# Patient Record
Sex: FEMALE | Race: WHITE | NOT HISPANIC OR LATINO | Employment: UNEMPLOYED | URBAN - METROPOLITAN AREA
[De-identification: names, ages, dates, MRNs, and addresses within clinical notes are randomized per-mention and may not be internally consistent; named-entity substitution may affect disease eponyms.]

---
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
## 2025-01-06 ENCOUNTER — TELEMEDICINE (OUTPATIENT)
Dept: PSYCHIATRY | Facility: CLINIC | Age: 20
End: 2025-01-06
Payer: COMMERCIAL

## 2025-01-06 ENCOUNTER — PATIENT MESSAGE (OUTPATIENT)
Dept: FAMILY MEDICINE CLINIC | Facility: CLINIC | Age: 20
End: 2025-01-06
Payer: COMMERCIAL

## 2025-01-06 DIAGNOSIS — F90.2 ATTENTION DEFICIT HYPERACTIVITY DISORDER, COMBINED TYPE: Primary | ICD-10-CM

## 2025-01-06 DIAGNOSIS — F43.10 POSTTRAUMATIC STRESS DISORDER: ICD-10-CM

## 2025-01-06 DIAGNOSIS — F41.9 ANXIETY: ICD-10-CM

## 2025-01-06 RX ORDER — DEXTROAMPHETAMINE SACCHARATE, AMPHETAMINE ASPARTATE MONOHYDRATE, DEXTROAMPHETAMINE SULFATE AND AMPHETAMINE SULFATE 2.5; 2.5; 2.5; 2.5 MG/1; MG/1; MG/1; MG/1
10 CAPSULE, EXTENDED RELEASE ORAL DAILY
Qty: 30 CAPSULE | Refills: 0 | Status: SHIPPED | OUTPATIENT
Start: 2025-01-06 | End: 2026-01-06
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
## 2025-01-13 ENCOUNTER — PATIENT MESSAGE (OUTPATIENT)
Dept: FAMILY MEDICINE CLINIC | Facility: CLINIC | Age: 20
End: 2025-01-13
Payer: COMMERCIAL

## 2025-02-04 ENCOUNTER — TELEMEDICINE (OUTPATIENT)
Dept: PSYCHIATRY | Facility: CLINIC | Age: 20
End: 2025-02-04
Payer: COMMERCIAL

## 2025-02-04 DIAGNOSIS — F43.10 POSTTRAUMATIC STRESS DISORDER: ICD-10-CM

## 2025-02-04 DIAGNOSIS — F33.1 RECURRENT MAJOR DEPRESSIVE EPISODES, MODERATE: ICD-10-CM

## 2025-02-04 DIAGNOSIS — F90.2 ATTENTION DEFICIT HYPERACTIVITY DISORDER, COMBINED TYPE: Primary | ICD-10-CM

## 2025-02-04 RX ORDER — DEXTROAMPHETAMINE SACCHARATE, AMPHETAMINE ASPARTATE MONOHYDRATE, DEXTROAMPHETAMINE SULFATE AND AMPHETAMINE SULFATE 3.75; 3.75; 3.75; 3.75 MG/1; MG/1; MG/1; MG/1
15 CAPSULE, EXTENDED RELEASE ORAL DAILY
Qty: 30 CAPSULE | Refills: 0 | Status: SHIPPED | OUTPATIENT
Start: 2025-02-04 | End: 2026-02-04
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
## 2025-03-17 ENCOUNTER — OFFICE VISIT (OUTPATIENT)
Dept: FAMILY MEDICINE CLINIC | Facility: CLINIC | Age: 20
End: 2025-03-17
Payer: COMMERCIAL

## 2025-03-17 VITALS
TEMPERATURE: 97.9 F | HEART RATE: 96 BPM | DIASTOLIC BLOOD PRESSURE: 70 MMHG | WEIGHT: 128.4 LBS | SYSTOLIC BLOOD PRESSURE: 120 MMHG | OXYGEN SATURATION: 99 % | BODY MASS INDEX: 20.73 KG/M2

## 2025-03-17 DIAGNOSIS — Z78.9 TRANSGENDER: ICD-10-CM

## 2025-03-17 DIAGNOSIS — Z30.41 ENCOUNTER FOR SURVEILLANCE OF CONTRACEPTIVE PILLS: ICD-10-CM

## 2025-03-17 DIAGNOSIS — F33.1 RECURRENT MAJOR DEPRESSIVE EPISODES, MODERATE: ICD-10-CM

## 2025-03-17 DIAGNOSIS — F41.9 ANXIETY: ICD-10-CM

## 2025-03-17 DIAGNOSIS — F64.9 GENDER IDENTITY DISORDER, UNSPECIFIED: Primary | ICD-10-CM

## 2025-03-17 PROCEDURE — 99214 OFFICE O/P EST MOD 30 MIN: CPT | Performed by: NURSE PRACTITIONER

## 2025-03-17 RX ORDER — NORGESTREL-ETHINYL ESTRADIOL 0.3-0.03MG
1 TABLET ORAL DAILY
Qty: 84 TABLET | Refills: 1 | Status: SHIPPED | OUTPATIENT
Start: 2025-03-17

## 2025-03-17 RX ORDER — VENLAFAXINE HYDROCHLORIDE 75 MG/1
75 CAPSULE, EXTENDED RELEASE ORAL DAILY
Qty: 90 CAPSULE | Refills: 1 | Status: SHIPPED | OUTPATIENT
Start: 2025-03-17

## 2025-03-17 NOTE — PROGRESS NOTES
Chief Complaint  Contraception (Needs refill), transgender (Is wanting to start taking  testosterone), and Depression (Would like to lower the dose.)    The PHQ has not been completed during this encounter.       History of Present Illness  Eslie Kowalski is a 19 y.o. adult who presents to [facility] with a past medical history of  Past Medical History:   Diagnosis Date    ADHD (attention deficit hyperactivity disorder)     Altered mental status 04/20/2023    Anxiety     Depression     Seizures        HPI     The patient is a 19-year-old female who presents to obtain a refill of birth control.    She is seeking a referral to endocrinology for the initiation of testosterone therapy. She has expressed a preference for Dr. Dias, a psychiatrist, based on a friend's recommendation. She has been informed that Dr. Dias likely does not prescribe testosterone. She has consulted with her neurologist regarding this potential treatment due to her seizure history. She is currently in the process of discussing this with her mother and conducting further research. She is not in a hurry to start the testosterone therapy.    She is currently not sexually active and uses birth control primarily for menstrual regulation. She experiences severe cramping during her menstrual cycle, which she manages with ibuprofen. Her physical activity includes frequent walking on campus. Her last menstrual cycle occurred from 02/18/2025 to 02/23/2025, and her cycles are regular while on medication.    She continues to take Claritin as needed and occasionally uses Flonase. She is also on Adderall, prescribed by Turner Kapadia MD.    She is currently on Effexor for depression but reports that it causes paranoia and jitteriness when taken concurrently with Adderall. She has attempted to take Effexor at night but found it disrupted her sleep. She reports no suicidal or homicidal ideation. She is not currently  taking BuSpar.    She has a history of migraines, which have been increasing in frequency recently. She believes these may be triggered by seasonal changes. She is hesitant to start a new medication prescribed by her neurologist due to potential side effects on memory.    Supplemental Information  She is under the care of a neurologist for potential seizures, with annual follow-ups scheduled.    MEDICATIONS  Current: Adderall, Cryselle, Flonase, ibuprofen, Claritin, Effexor  Discontinued: BuSpar       Objective   Vital Signs:   Vitals:    03/17/25 1411   BP: 120/70   Pulse: 96   Temp: 97.9 °F (36.6 °C)   SpO2: 99%   Weight: 58.2 kg (128 lb 6.4 oz)   PainSc: 0-No pain     Body mass index is 20.73 kg/m².    Wt Readings from Last 3 Encounters:   03/17/25 58.2 kg (128 lb 6.4 oz) (50%, Z= 0.00)*   12/21/24 59.9 kg (132 lb 0.9 oz) (57%, Z= 0.18)*   10/11/24 59.9 kg (132 lb) (58%, Z= 0.20)*     * Growth percentiles are based on CDC (Girls, 2-20 Years) data.     BP Readings from Last 3 Encounters:   03/17/25 120/70   12/21/24 112/57   10/11/24 100/60       Health Maintenance   Topic Date Due    HEPATITIS C SCREENING  Never done    ANNUAL PHYSICAL  Never done    COVID-19 Vaccine (1 - 2024-25 season) Never done    TDAP/TD VACCINES (3 - Td or Tdap) 08/23/2034    INFLUENZA VACCINE  Completed    MENINGOCOCCAL B VACCINE  Completed    MENINGOCOCCAL VACCINE  Completed    HPV VACCINES  Completed    Pneumococcal Vaccine 0-49  Aged Out       Physical Exam  Vitals reviewed.   Constitutional:       General: He is not in acute distress.     Appearance: Normal appearance. He is well-developed.   HENT:      Head: Normocephalic and atraumatic.      Right Ear: External ear normal.      Left Ear: External ear normal.   Eyes:      Conjunctiva/sclera: Conjunctivae normal.      Pupils: Pupils are equal, round, and reactive to light.   Cardiovascular:      Rate and Rhythm: Normal rate and regular rhythm.      Heart sounds: Normal heart sounds.    Pulmonary:      Effort: Pulmonary effort is normal.      Breath sounds: Normal breath sounds.   Musculoskeletal:      Cervical back: Neck supple.      Right lower leg: No edema.      Left lower leg: No edema.   Skin:     General: Skin is warm and dry.   Neurological:      Mental Status: He is alert and oriented to person, place, and time.   Psychiatric:         Mood and Affect: Mood and affect normal.         Behavior: Behavior normal.         Thought Content: Thought content normal.         Judgment: Judgment normal.            Result Review :  The following data was reviewed by: GHAZAL Rooney on 03/17/2025:  Results       Common labs          4/13/2024    08:54   Common Labs   Glucose 77    BUN 12    Creatinine 0.71    Sodium 138    Potassium 3.8    Chloride 103    Calcium 9.1    Albumin 4.2    Total Bilirubin 0.2    Alkaline Phosphatase 65    AST (SGOT) 11    ALT (SGPT) 10    WBC 4.38    Hemoglobin 12.5    Hematocrit 37.6    Platelets 182          Procedures        Assessment and Plan   Diagnoses and all orders for this visit:    1. Gender identity disorder, unspecified (Primary)  -     Ambulatory Referral to Psychiatry    2. Encounter for surveillance of contraceptive pills  -     Cryselle-28 0.3-30 MG-MCG per tablet; Take 1 tablet by mouth Daily. Indications: Painful Menstrual Periods  Dispense: 84 tablet; Refill: 1    3. Transgender  -     Ambulatory Referral to Psychiatry    4. Recurrent major depressive episodes, moderate  -     venlafaxine XR (Effexor XR) 75 MG 24 hr capsule; Take 1 capsule by mouth Daily.  Dispense: 90 capsule; Refill: 1    5. Anxiety  -     venlafaxine XR (Effexor XR) 75 MG 24 hr capsule; Take 1 capsule by mouth Daily.  Dispense: 90 capsule; Refill: 1         1. Medication management.  Her medication list has been reviewed and updated where appropriate. Effexor will be reduced back to 75 mg due to side effects such as jitteriness and paranoia. She is advised to continue taking  Claritin and Flonase as needed for allergy symptoms. Prescriptions for Cryselle and Effexor have been sent to the pharmacy for a 6-month supply.    2. Menstrual cramps.  She experiences significant cramping during her menstrual periods. She is advised to continue using ibuprofen as needed for pain relief.    3. Migraines.  She has a history of migraines, which may be triggered by seasonal changes or allergies. She is advised to start taking Claritin and Flonase to see if they help alleviate her symptoms. She prefers to avoid migraine medications due to potential side effects such as memory issues.    4. Referral to endocrinology.  A referral to Dr. Dias for potential testosterone therapy has been initiated. If Dr. Dias does not prescribe testosterone, an endocrinology referral will be placed as needed.    Follow-up  The patient will follow up in 6 months.     Pediatric BMI = 37 %ile (Z= -0.32) based on CDC (Girls, 2-20 Years) BMI-for-age data using weight from 3/17/2025 and height from 12/21/2024.. BMI is within normal parameters. No other follow-up for BMI required.         FOLLOW UP  Return in about 6 months (around 9/17/2025) for Refill.    Patient was given instructions and counseling regarding his condition or for health maintenance advice. Please see specific information pulled into the AVS if appropriate.       Tisha uTrner, GHAZAL  03/17/25  22:51 EDT    CURRENT & DISCONTINUED MEDICATIONS  Current Outpatient Medications   Medication Instructions    amphetamine-dextroamphetamine XR (Adderall XR) 15 MG 24 hr capsule 15 mg, Oral, Daily    Cryselle-28 0.3-30 MG-MCG per tablet 1 tablet, Oral, Daily    fluticasone (FLONASE) 50 MCG/ACT nasal spray 2 sprays, Nasal, Daily    ibuprofen (ADVIL,MOTRIN) 600 mg, Oral, Every 8 Hours PRN    loratadine (CLARITIN) 10 MG tablet 1 tablet, Daily PRN    venlafaxine XR (EFFEXOR XR) 75 mg, Oral, Daily       Medications Discontinued During This Encounter   Medication Reason     busPIRone (BUSPAR) 15 MG tablet     Cryselle-28 0.3-30 MG-MCG per tablet Reorder    venlafaxine XR (Effexor XR) 150 MG 24 hr capsule Reorder        Patient or patient representative verbalized consent for the use of Ambient Listening during the visit with  GHAZAL Rooney for chart documentation. 3/17/2025  14:10 EDT

## 2025-03-20 ENCOUNTER — PATIENT MESSAGE (OUTPATIENT)
Dept: FAMILY MEDICINE CLINIC | Facility: CLINIC | Age: 20
End: 2025-03-20
Payer: COMMERCIAL

## 2025-03-21 DIAGNOSIS — Z78.9 TRANSGENDER: ICD-10-CM

## 2025-03-21 DIAGNOSIS — F64.9 GENDER IDENTITY DISORDER, UNSPECIFIED: Primary | ICD-10-CM

## 2025-04-01 ENCOUNTER — TELEMEDICINE (OUTPATIENT)
Dept: PSYCHIATRY | Facility: CLINIC | Age: 20
End: 2025-04-01
Payer: COMMERCIAL

## 2025-04-01 DIAGNOSIS — F43.10 POSTTRAUMATIC STRESS DISORDER: Primary | ICD-10-CM

## 2025-04-01 DIAGNOSIS — F44.5 PSYCHOGENIC NONEPILEPTIC SEIZURE: ICD-10-CM

## 2025-04-01 DIAGNOSIS — F90.2 ATTENTION DEFICIT HYPERACTIVITY DISORDER, COMBINED TYPE: ICD-10-CM

## 2025-04-01 DIAGNOSIS — F33.1 RECURRENT MAJOR DEPRESSIVE EPISODES, MODERATE: ICD-10-CM

## 2025-04-01 PROBLEM — J30.9 ALLERGIC RHINITIS: Status: ACTIVE | Noted: 2024-09-30

## 2025-04-01 RX ORDER — DEXTROAMPHETAMINE SACCHARATE, AMPHETAMINE ASPARTATE MONOHYDRATE, DEXTROAMPHETAMINE SULFATE AND AMPHETAMINE SULFATE 3.75; 3.75; 3.75; 3.75 MG/1; MG/1; MG/1; MG/1
15 CAPSULE, EXTENDED RELEASE ORAL DAILY
Qty: 30 CAPSULE | Refills: 0 | Status: SHIPPED | OUTPATIENT
Start: 2025-04-01 | End: 2026-04-01

## 2025-04-01 RX ORDER — HYDROXYZINE HYDROCHLORIDE 10 MG/1
10 TABLET, FILM COATED ORAL 3 TIMES DAILY PRN
Qty: 30 TABLET | Refills: 0 | Status: SHIPPED | OUTPATIENT
Start: 2025-04-01
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
## 2025-05-01 ENCOUNTER — TELEMEDICINE (OUTPATIENT)
Dept: PSYCHIATRY | Facility: CLINIC | Age: 20
End: 2025-05-01
Payer: COMMERCIAL

## 2025-05-01 DIAGNOSIS — F41.9 ANXIETY: ICD-10-CM

## 2025-05-01 DIAGNOSIS — F33.1 RECURRENT MAJOR DEPRESSIVE EPISODES, MODERATE: ICD-10-CM

## 2025-05-01 DIAGNOSIS — F43.10 POSTTRAUMATIC STRESS DISORDER: ICD-10-CM

## 2025-05-01 DIAGNOSIS — F44.5 PSYCHOGENIC NONEPILEPTIC SEIZURE: ICD-10-CM

## 2025-05-01 DIAGNOSIS — F90.2 ATTENTION DEFICIT HYPERACTIVITY DISORDER, COMBINED TYPE: Primary | ICD-10-CM

## 2025-05-01 RX ORDER — DEXTROAMPHETAMINE SACCHARATE, AMPHETAMINE ASPARTATE MONOHYDRATE, DEXTROAMPHETAMINE SULFATE AND AMPHETAMINE SULFATE 3.75; 3.75; 3.75; 3.75 MG/1; MG/1; MG/1; MG/1
15 CAPSULE, EXTENDED RELEASE ORAL DAILY
Qty: 30 CAPSULE | Refills: 0 | Status: SHIPPED | OUTPATIENT
Start: 2025-05-01 | End: 2026-05-01
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
## 2025-06-19 ENCOUNTER — TELEMEDICINE (OUTPATIENT)
Dept: PSYCHIATRY | Facility: CLINIC | Age: 20
End: 2025-06-19
Payer: COMMERCIAL

## 2025-06-19 DIAGNOSIS — F33.1 RECURRENT MAJOR DEPRESSIVE EPISODES, MODERATE: ICD-10-CM

## 2025-06-19 DIAGNOSIS — F43.10 POSTTRAUMATIC STRESS DISORDER: ICD-10-CM

## 2025-06-19 DIAGNOSIS — F90.2 ATTENTION DEFICIT HYPERACTIVITY DISORDER, COMBINED TYPE: Primary | ICD-10-CM

## 2025-06-19 RX ORDER — LISDEXAMFETAMINE DIMESYLATE 20 MG/1
20 CAPSULE ORAL EVERY MORNING
Qty: 30 CAPSULE | Refills: 0 | Status: SHIPPED | OUTPATIENT
Start: 2025-06-19 | End: 2025-06-25 | Stop reason: SDUPTHER

## 2025-06-19 RX ORDER — HYDROXYZINE HYDROCHLORIDE 10 MG/1
10 TABLET, FILM COATED ORAL 3 TIMES DAILY PRN
Qty: 30 TABLET | Refills: 1 | Status: SHIPPED | OUTPATIENT
Start: 2025-06-19

## 2025-06-19 RX ORDER — LISDEXAMFETAMINE DIMESYLATE 20 MG/1
20 CAPSULE ORAL EVERY MORNING
Qty: 30 CAPSULE | Refills: 0 | Status: SHIPPED | OUTPATIENT
Start: 2025-06-19 | End: 2025-06-19
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
## 2025-06-24 DIAGNOSIS — F90.2 ATTENTION DEFICIT HYPERACTIVITY DISORDER, COMBINED TYPE: ICD-10-CM

## 2025-06-24 NOTE — TELEPHONE ENCOUNTER
Patient called and stated that she was told by [facility] pharmacy that they did not receive rx for Vyvanse. Called pharmacy, they are out to lunch till 2 pm. Will call back at that time. Tried calling the pharmacy back, was unable to get anyone to  the call. Called the patient to update her that I will try pharmacy back first thing in the am when they open.

## 2025-06-25 RX ORDER — LISDEXAMFETAMINE DIMESYLATE 20 MG/1
20 CAPSULE ORAL EVERY MORNING
Qty: 30 CAPSULE | Refills: 0 | Status: SHIPPED | OUTPATIENT
Start: 2025-06-25
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
## 2025-06-26 ENCOUNTER — OFFICE VISIT (OUTPATIENT)
Dept: FAMILY MEDICINE CLINIC | Facility: CLINIC | Age: 20
End: 2025-06-26
Payer: COMMERCIAL

## 2025-06-26 VITALS
SYSTOLIC BLOOD PRESSURE: 110 MMHG | OXYGEN SATURATION: 99 % | DIASTOLIC BLOOD PRESSURE: 60 MMHG | HEIGHT: 66 IN | HEART RATE: 105 BPM | BODY MASS INDEX: 20.6 KG/M2 | TEMPERATURE: 98.6 F | WEIGHT: 128.2 LBS

## 2025-06-26 DIAGNOSIS — Z30.018 ENCOUNTER FOR INITIAL PRESCRIPTION OF OTHER CONTRACEPTIVES: Primary | ICD-10-CM

## 2025-06-26 DIAGNOSIS — Z11.59 NEED FOR HEPATITIS C SCREENING TEST: ICD-10-CM

## 2025-06-26 DIAGNOSIS — Z78.9 TRANSGENDER: ICD-10-CM

## 2025-06-26 DIAGNOSIS — N92.0 MENORRHAGIA WITH REGULAR CYCLE: ICD-10-CM

## 2025-06-26 DIAGNOSIS — F64.9 GENDER IDENTITY DISORDER, UNSPECIFIED: ICD-10-CM

## 2025-06-26 LAB
ALBUMIN SERPL-MCNC: 4.5 G/DL (ref 3.5–5.2)
ALBUMIN/GLOB SERPL: 1.7 G/DL
ALP SERPL-CCNC: 73 U/L (ref 39–117)
ALT SERPL W P-5'-P-CCNC: 9 U/L (ref 1–33)
ANION GAP SERPL CALCULATED.3IONS-SCNC: 13 MMOL/L (ref 5–15)
AST SERPL-CCNC: 14 U/L (ref 1–32)
B-HCG UR QL: NEGATIVE
BASOPHILS # BLD AUTO: 0.01 10*3/MM3 (ref 0–0.2)
BASOPHILS NFR BLD AUTO: 0.2 % (ref 0–1.5)
BILIRUB SERPL-MCNC: 0.5 MG/DL (ref 0–1.2)
BUN SERPL-MCNC: 8 MG/DL (ref 6–20)
BUN/CREAT SERPL: 10.5 (ref 7–25)
CALCIUM SPEC-SCNC: 9.3 MG/DL (ref 8.6–10.5)
CHLORIDE SERPL-SCNC: 103 MMOL/L (ref 98–107)
CO2 SERPL-SCNC: 23 MMOL/L (ref 22–29)
CREAT SERPL-MCNC: 0.76 MG/DL (ref 0.57–1)
DEPRECATED RDW RBC AUTO: 40.9 FL (ref 37–54)
EGFRCR SERPLBLD CKD-EPI 2021: 115.2 ML/MIN/1.73
EOSINOPHIL # BLD AUTO: 0 10*3/MM3 (ref 0–0.4)
EOSINOPHIL NFR BLD AUTO: 0 % (ref 0.3–6.2)
ERYTHROCYTE [DISTWIDTH] IN BLOOD BY AUTOMATED COUNT: 15 % (ref 12.3–15.4)
EXPIRATION DATE: NORMAL
FERRITIN SERPL-MCNC: 9.81 NG/ML (ref 13–150)
GLOBULIN UR ELPH-MCNC: 2.7 GM/DL
GLUCOSE SERPL-MCNC: 86 MG/DL (ref 65–99)
HCT VFR BLD AUTO: 35.2 % (ref 34–46.6)
HCV AB SER QL: NORMAL
HGB BLD-MCNC: 10.8 G/DL (ref 12–15.9)
IMM GRANULOCYTES # BLD AUTO: 0.01 10*3/MM3 (ref 0–0.05)
IMM GRANULOCYTES NFR BLD AUTO: 0.2 % (ref 0–0.5)
INTERNAL NEGATIVE CONTROL: NORMAL
INTERNAL POSITIVE CONTROL: NORMAL
IRON 24H UR-MRATE: 67 MCG/DL (ref 37–145)
IRON SATN MFR SERPL: 13 % (ref 20–50)
LYMPHOCYTES # BLD AUTO: 1.25 10*3/MM3 (ref 0.7–3.1)
LYMPHOCYTES NFR BLD AUTO: 27.4 % (ref 19.6–45.3)
Lab: NORMAL
MCH RBC QN AUTO: 23.4 PG (ref 26.6–33)
MCHC RBC AUTO-ENTMCNC: 30.7 G/DL (ref 31.5–35.7)
MCV RBC AUTO: 76.2 FL (ref 79–97)
MONOCYTES # BLD AUTO: 0.38 10*3/MM3 (ref 0.1–0.9)
MONOCYTES NFR BLD AUTO: 8.3 % (ref 5–12)
NEUTROPHILS NFR BLD AUTO: 2.92 10*3/MM3 (ref 1.7–7)
NEUTROPHILS NFR BLD AUTO: 63.9 % (ref 42.7–76)
NRBC BLD AUTO-RTO: 0 /100 WBC (ref 0–0.2)
PLATELET # BLD AUTO: 186 10*3/MM3 (ref 140–450)
PMV BLD AUTO: 11.8 FL (ref 6–12)
POTASSIUM SERPL-SCNC: 3.8 MMOL/L (ref 3.5–5.2)
PROT SERPL-MCNC: 7.2 G/DL (ref 6–8.5)
RBC # BLD AUTO: 4.62 10*6/MM3 (ref 3.77–5.28)
SODIUM SERPL-SCNC: 139 MMOL/L (ref 136–145)
TIBC SERPL-MCNC: 532 MCG/DL (ref 298–536)
TRANSFERRIN SERPL-MCNC: 357 MG/DL (ref 200–360)
TSH SERPL DL<=0.05 MIU/L-ACNC: 2.24 UIU/ML (ref 0.27–4.2)
WBC NRBC COR # BLD AUTO: 4.57 10*3/MM3 (ref 3.4–10.8)

## 2025-06-26 PROCEDURE — 83540 ASSAY OF IRON: CPT | Performed by: NURSE PRACTITIONER

## 2025-06-26 PROCEDURE — 82728 ASSAY OF FERRITIN: CPT | Performed by: NURSE PRACTITIONER

## 2025-06-26 PROCEDURE — 80053 COMPREHEN METABOLIC PANEL: CPT | Performed by: NURSE PRACTITIONER

## 2025-06-26 PROCEDURE — 86803 HEPATITIS C AB TEST: CPT | Performed by: NURSE PRACTITIONER

## 2025-06-26 PROCEDURE — 85025 COMPLETE CBC W/AUTO DIFF WBC: CPT | Performed by: NURSE PRACTITIONER

## 2025-06-26 PROCEDURE — 84466 ASSAY OF TRANSFERRIN: CPT | Performed by: NURSE PRACTITIONER

## 2025-06-26 PROCEDURE — 84443 ASSAY THYROID STIM HORMONE: CPT | Performed by: NURSE PRACTITIONER

## 2025-06-26 RX ORDER — NORELGESTROMIN AND ETHINYL ESTRADIOL 35; 150 UG/MG; UG/MG
1 PATCH TRANSDERMAL WEEKLY
Qty: 9 PATCH | Refills: 1 | Status: SHIPPED | OUTPATIENT
Start: 2025-06-26

## 2025-06-26 RX ORDER — DEXTROAMPHETAMINE SACCHARATE, AMPHETAMINE ASPARTATE, DEXTROAMPHETAMINE SULFATE AND AMPHETAMINE SULFATE 2.5; 2.5; 2.5; 2.5 MG/1; MG/1; MG/1; MG/1
1 TABLET ORAL DAILY
COMMUNITY

## 2025-06-26 NOTE — PROGRESS NOTES
..  Venipuncture Blood Specimen Collection  Venipuncture performed in Lt arm by Flory Astudillo MA with good hemostasis. Patient tolerated the procedure well without complications.   06/26/25   Flory Astudillo MA

## 2025-06-26 NOTE — PROGRESS NOTES
Chief Complaint  Contraception (Would like to discuss changing from the pill to the patch due to forgetfulness. ) and Labs Only (Pt also would like to have her hepatitis c screening done as well. )    The PHQ has not been completed during this encounter.       History of Present Illness  Elsie Kowalski is a 20 y.o. adult who presents to [facility] with a past medical history of  Past Medical History:   Diagnosis Date    ADHD (attention deficit hyperactivity disorder)     Altered mental status 04/20/2023    Anxiety     Depression     Seizures        HPI     The patient is a 20-year-old female who presents for evaluation of menstrual cramps, heavy menstrual bleeding, and anemia.    She expresses a desire to transition from oral contraceptives to the patch due to difficulties in remembering to take the pill. Her menstrual cycles are generally regular, although slightly prolonged when she misses her contraceptive pill. She experiences severe menstrual cramps, which radiate to her back, particularly during the initial days of her period. These symptoms are managed with Midol. She is not currently on any other hormonal treatments but has an upcoming appointment on 07/10/2025 for a consultation regarding potential hormone therapy. She is not sexually active and reports no chest pain or palpitations.    She also reports heavy menstrual bleeding.    She has a history of borderline anemia, as diagnosed during her last seizure episode. She has been supplementing with iron pills.    She is transitioning from Adderall to Vyvanse and has already picked up the medication today. She experiences unusual headaches and is currently under the care of a neurologist for this issue.    GYNECOLOGICAL HISTORY:  - Frequency and Flow: Heavy menstrual bleeding  - Menstrual Pain: Severe menstrual cramps    FAMILY HISTORY  Her mother has anemia problems related to menstrual periods.       Objective  "  Vital Signs:   Vitals:    06/26/25 1059   BP: 110/60   BP Location: Left arm   Patient Position: Sitting   Pulse: 105   Temp: 98.6 °F (37 °C)   SpO2: 99%   Weight: 58.2 kg (128 lb 3.2 oz)   Height: 167.6 cm (65.98\")   PainSc: 0-No pain     Body mass index is 20.7 kg/m².    Wt Readings from Last 3 Encounters:   06/26/25 58.2 kg (128 lb 3.2 oz)   03/17/25 58.2 kg (128 lb 6.4 oz) (50%, Z= 0.00)*   12/21/24 59.9 kg (132 lb 0.9 oz) (57%, Z= 0.18)*     * Growth percentiles are based on CDC (Girls, 2-20 Years) data.     BP Readings from Last 3 Encounters:   06/26/25 110/60   03/17/25 120/70   12/21/24 112/57       Health Maintenance   Topic Date Due    HEPATITIS C SCREENING  Never done    ANNUAL PHYSICAL  Never done    INFLUENZA VACCINE  07/01/2025    TDAP/TD VACCINES (3 - Td or Tdap) 08/23/2034    COVID-19 Vaccine  Completed    MENINGOCOCCAL B VACCINE  Completed    MENINGOCOCCAL VACCINE  Completed    HPV VACCINES  Completed    Pneumococcal Vaccine 0-49  Aged Out       Physical Exam  Vitals reviewed.   Constitutional:       General: He is not in acute distress.     Appearance: Normal appearance. He is well-developed.   HENT:      Head: Normocephalic and atraumatic.      Right Ear: External ear normal.      Left Ear: External ear normal.   Eyes:      Conjunctiva/sclera: Conjunctivae normal.      Pupils: Pupils are equal, round, and reactive to light.   Cardiovascular:      Rate and Rhythm: Normal rate and regular rhythm.      Heart sounds: Normal heart sounds.   Pulmonary:      Effort: Pulmonary effort is normal.      Breath sounds: Normal breath sounds.   Musculoskeletal:      Right lower leg: No edema.      Left lower leg: No edema.   Skin:     General: Skin is warm and dry.   Neurological:      Mental Status: He is alert and oriented to person, place, and time.   Psychiatric:         Mood and Affect: Mood and affect normal.         Behavior: Behavior normal.         Thought Content: Thought content normal.         " Judgment: Judgment normal.            Result Review :  The following data was reviewed by: GHAZAL Rooney on 06/26/2025:  Results  Labs   - Pregnancy test: Negative         Procedures        Assessment and Plan   Diagnoses and all orders for this visit:    1. Encounter for initial prescription of other contraceptives (Primary)  -     POCT pregnancy, urine    2. Need for hepatitis C screening test  -     Hepatitis C Antibody    3. Transgender    4. Gender identity disorder, unspecified    5. Menorrhagia with regular cycle  -     Iron Profile w/o Ferritin  -     Ferritin  -     CBC & Differential  -     Comprehensive Metabolic Panel  -     TSH Rfx On Abnormal To Free T4  -     norelgestromin-ethinyl estradiol (ORTHO EVRA) 150-35 MCG/24HR; Place 1 patch on the skin as directed by provider 1 (One) Time Per Week. After the third patch is removed leave it off for a week.  Dispense: 9 patch; Refill: 1    Other orders  -     COVID-19 (Pfizer) 12yrs+ (COMIRNATY)         1. Menstrual cramps.  - Reports severe menstrual cramps, especially during the first few days of her period.  - Advised to maintain adequate hydration and consider taking a multivitamin with iron or a prenatal vitamin to help manage symptoms.  - Prescription for the Ortho Evra patch provided, with instructions to apply a new patch weekly for three weeks, followed by a week off.  - Instructed to start using the patch on the Sunday following the onset of her next menstrual cycle; information about the patch included in her handout.    2. Heavy menstrual bleeding.  - Experiences heavy menstrual bleeding, which may contribute to her borderline anemia.  - CBC ordered to check iron levels.  - Advised to continue taking iron supplements as needed.    3. Anemia.  - History of borderline anemia, likely exacerbated by heavy menstrual bleeding.  - CBC ordered to assess current iron levels.  - Advised to continue taking iron supplements and consider a  multivitamin with iron or a prenatal vitamin.    Follow-up  - Follow-up scheduled in 6 months.     BMI is within normal parameters. No other follow-up for BMI required.         FOLLOW UP  Return in about 6 months (around 12/26/2025) for Recheck, Refill.    Patient was given instructions and counseling regarding his condition or for health maintenance advice. Please see specific information pulled into the AVS if appropriate.       GHAZAL Rooney  06/26/25  12:39 EDT    CURRENT & DISCONTINUED MEDICATIONS  Current Outpatient Medications   Medication Instructions    amphetamine-dextroamphetamine (Adderall) 10 MG tablet 1 tablet, Daily    fluticasone (FLONASE) 50 MCG/ACT nasal spray 2 sprays, Nasal, Daily    hydrOXYzine (ATARAX) 10 mg, Oral, 3 Times Daily PRN    ibuprofen (ADVIL,MOTRIN) 600 mg, Oral, Every 8 Hours PRN    lisdexamfetamine (VYVANSE) 20 mg, Oral, Every Morning    loratadine (CLARITIN) 10 MG tablet 1 tablet, Daily PRN    norelgestromin-ethinyl estradiol (ORTHO EVRA) 150-35 MCG/24HR 1 patch, Transdermal, Weekly, After the third patch is removed leave it off for a week.    venlafaxine XR (EFFEXOR XR) 75 mg, Oral, Daily       Medications Discontinued During This Encounter   Medication Reason    Cryselle-28 0.3-30 MG-MCG per tablet Alternate therapy        Patient or patient representative verbalized consent for the use of Ambient Listening during the visit with  GHAZAL Rooney for chart documentation. 6/26/2025  11:28 EDT

## 2025-07-09 ENCOUNTER — TELEPHONE (OUTPATIENT)
Dept: ENDOCRINOLOGY | Age: 20
End: 2025-07-09
Payer: COMMERCIAL

## 2025-07-11 ENCOUNTER — PATIENT MESSAGE (OUTPATIENT)
Dept: FAMILY MEDICINE CLINIC | Facility: CLINIC | Age: 20
End: 2025-07-11
Payer: COMMERCIAL

## 2025-07-21 ENCOUNTER — CLINICAL SUPPORT (OUTPATIENT)
Dept: FAMILY MEDICINE CLINIC | Facility: CLINIC | Age: 20
End: 2025-07-21
Payer: COMMERCIAL

## 2025-07-21 DIAGNOSIS — D50.9 IRON DEFICIENCY ANEMIA, UNSPECIFIED IRON DEFICIENCY ANEMIA TYPE: ICD-10-CM

## 2025-07-21 LAB
FERRITIN SERPL-MCNC: 13.4 NG/ML (ref 13–150)
IRON 24H UR-MRATE: 21 MCG/DL (ref 37–145)
IRON SATN MFR SERPL: 4 % (ref 20–50)
TIBC SERPL-MCNC: 516 MCG/DL (ref 298–536)
TRANSFERRIN SERPL-MCNC: 346 MG/DL (ref 200–360)

## 2025-07-21 PROCEDURE — 84466 ASSAY OF TRANSFERRIN: CPT | Performed by: NURSE PRACTITIONER

## 2025-07-21 PROCEDURE — 83540 ASSAY OF IRON: CPT | Performed by: NURSE PRACTITIONER

## 2025-07-21 PROCEDURE — 82728 ASSAY OF FERRITIN: CPT | Performed by: NURSE PRACTITIONER

## 2025-07-21 PROCEDURE — 36415 COLL VENOUS BLD VENIPUNCTURE: CPT | Performed by: NURSE PRACTITIONER

## 2025-07-21 NOTE — PROGRESS NOTES
..  Venipuncture Blood Specimen Collection  Venipuncture performed in LT arm by Karen Michel with good hemostasis. Patient tolerated the procedure well without complications.   07/21/25   Flory Astudillo MA

## 2025-07-23 ENCOUNTER — TELEMEDICINE (OUTPATIENT)
Dept: PSYCHIATRY | Facility: CLINIC | Age: 20
End: 2025-07-23
Payer: COMMERCIAL

## 2025-07-23 DIAGNOSIS — F33.1 RECURRENT MAJOR DEPRESSIVE EPISODES, MODERATE: ICD-10-CM

## 2025-07-23 DIAGNOSIS — F43.10 POSTTRAUMATIC STRESS DISORDER: ICD-10-CM

## 2025-07-23 DIAGNOSIS — F90.2 ATTENTION DEFICIT HYPERACTIVITY DISORDER, COMBINED TYPE: Primary | ICD-10-CM

## 2025-07-23 RX ORDER — CLOBAZAM 20 MG/1
TABLET ORAL
COMMUNITY
Start: 2025-06-23

## 2025-07-23 RX ORDER — ONDANSETRON 8 MG/1
8 TABLET, ORALLY DISINTEGRATING ORAL
COMMUNITY
Start: 2025-06-30

## 2025-07-23 RX ORDER — CALCIUM CARBONATE 300MG(750)
400 TABLET,CHEWABLE ORAL DAILY
COMMUNITY
Start: 2025-06-30

## 2025-07-23 RX ORDER — SUMATRIPTAN 50 MG/1
TABLET, FILM COATED ORAL
COMMUNITY

## 2025-07-23 RX ORDER — LISDEXAMFETAMINE DIMESYLATE 20 MG/1
20 CAPSULE ORAL EVERY MORNING
Qty: 30 CAPSULE | Refills: 0 | Status: SHIPPED | OUTPATIENT
Start: 2025-07-23

## 2025-07-23 RX ORDER — FEXOFENADINE HCL 180 MG/1
180 TABLET ORAL DAILY
COMMUNITY
Start: 2025-05-27
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
## 2025-07-25 ENCOUNTER — PATIENT MESSAGE (OUTPATIENT)
Dept: FAMILY MEDICINE CLINIC | Facility: CLINIC | Age: 20
End: 2025-07-25
Payer: COMMERCIAL

## 2025-08-01 ENCOUNTER — OFFICE VISIT (OUTPATIENT)
Dept: ENDOCRINOLOGY | Age: 20
End: 2025-08-01
Payer: COMMERCIAL

## 2025-08-01 ENCOUNTER — LAB (OUTPATIENT)
Facility: HOSPITAL | Age: 20
End: 2025-08-01
Payer: COMMERCIAL

## 2025-08-01 VITALS
HEART RATE: 99 BPM | HEIGHT: 66 IN | DIASTOLIC BLOOD PRESSURE: 80 MMHG | BODY MASS INDEX: 20.79 KG/M2 | SYSTOLIC BLOOD PRESSURE: 130 MMHG | WEIGHT: 129.4 LBS | OXYGEN SATURATION: 99 %

## 2025-08-01 DIAGNOSIS — Z78.9 TRANSGENDER MALE: Primary | ICD-10-CM

## 2025-08-01 LAB
ALBUMIN SERPL-MCNC: 4.5 G/DL (ref 3.5–5.2)
ALBUMIN/GLOB SERPL: 1.4 G/DL
ALP SERPL-CCNC: 56 U/L (ref 39–117)
ALT SERPL W P-5'-P-CCNC: 8 U/L (ref 1–33)
ANION GAP SERPL CALCULATED.3IONS-SCNC: 8.9 MMOL/L (ref 5–15)
AST SERPL-CCNC: 15 U/L (ref 1–32)
BILIRUB SERPL-MCNC: 0.3 MG/DL (ref 0–1.2)
BUN SERPL-MCNC: 10 MG/DL (ref 6–20)
BUN/CREAT SERPL: 14.1 (ref 7–25)
CALCIUM SPEC-SCNC: 9.2 MG/DL (ref 8.6–10.5)
CHLORIDE SERPL-SCNC: 105 MMOL/L (ref 98–107)
CO2 SERPL-SCNC: 26.1 MMOL/L (ref 22–29)
CREAT SERPL-MCNC: 0.71 MG/DL (ref 0.57–1)
DEPRECATED RDW RBC AUTO: 42.9 FL (ref 37–54)
EGFRCR SERPLBLD CKD-EPI 2021: 125 ML/MIN/1.73
ERYTHROCYTE [DISTWIDTH] IN BLOOD BY AUTOMATED COUNT: 15.8 % (ref 12.3–15.4)
ESTRADIOL SERPL HS-MCNC: 43.4 PG/ML
GLOBULIN UR ELPH-MCNC: 3.2 GM/DL
GLUCOSE SERPL-MCNC: 98 MG/DL (ref 65–99)
HCT VFR BLD AUTO: 35.7 % (ref 34–46.6)
HGB BLD-MCNC: 11.4 G/DL (ref 12–15.9)
MCH RBC QN AUTO: 24.6 PG (ref 26.6–33)
MCHC RBC AUTO-ENTMCNC: 31.9 G/DL (ref 31.5–35.7)
MCV RBC AUTO: 76.9 FL (ref 79–97)
PLATELET # BLD AUTO: 192 10*3/MM3 (ref 140–450)
PMV BLD AUTO: 11.8 FL (ref 6–12)
POTASSIUM SERPL-SCNC: 4.4 MMOL/L (ref 3.5–5.2)
PROT SERPL-MCNC: 7.7 G/DL (ref 6–8.5)
RBC # BLD AUTO: 4.64 10*6/MM3 (ref 3.77–5.28)
SODIUM SERPL-SCNC: 140 MMOL/L (ref 136–145)
TESTOST SERPL-MCNC: 32.5 NG/DL (ref 8.4–836)
WBC NRBC COR # BLD AUTO: 3.39 10*3/MM3 (ref 3.4–10.8)

## 2025-08-01 PROCEDURE — 84403 ASSAY OF TOTAL TESTOSTERONE: CPT | Performed by: STUDENT IN AN ORGANIZED HEALTH CARE EDUCATION/TRAINING PROGRAM

## 2025-08-01 PROCEDURE — 82670 ASSAY OF TOTAL ESTRADIOL: CPT | Performed by: STUDENT IN AN ORGANIZED HEALTH CARE EDUCATION/TRAINING PROGRAM

## 2025-08-01 PROCEDURE — 80053 COMPREHEN METABOLIC PANEL: CPT | Performed by: STUDENT IN AN ORGANIZED HEALTH CARE EDUCATION/TRAINING PROGRAM

## 2025-08-01 PROCEDURE — 85027 COMPLETE CBC AUTOMATED: CPT | Performed by: STUDENT IN AN ORGANIZED HEALTH CARE EDUCATION/TRAINING PROGRAM

## 2025-08-01 PROCEDURE — 36415 COLL VENOUS BLD VENIPUNCTURE: CPT | Performed by: STUDENT IN AN ORGANIZED HEALTH CARE EDUCATION/TRAINING PROGRAM

## 2025-08-01 NOTE — ASSESSMENT & PLAN NOTE
Check baseline labs today  Once cleared by psychiatry to start hormonal treatment, I will send a prescription for testosterone gel  Side effect of testosterone discussed in detail, including erythrocytosis, liver abnormalities, coronary artery disease, and hypertension

## 2025-08-01 NOTE — PROGRESS NOTES
"Chief Complaint/ reason for consult:    Gender Id Disorder (Pt would like to start on testosterone.)      History of Present Illness        Referred for further management of Gender identity disorder, unspecified; Transgender    [place]   He/him    Tres different in elementary school   Came out to friends and teachers at age 17  Mother is supportive  Does not smoke  Interested in female and male  Not sexually active  thinking about top surgery in the future  Does not smoke  Does not want to have kids in the future  Follows with psychiatrist for depression and anxiety  In 2024, was admitted to psychiatric hospital after attempting suicide  Expressed control for heavy menstrual bleeding  Would like to try testosterone gel  No history of blood clots            Objective   Vital Signs:  /80   Pulse 99   Ht 167.6 cm (65.98\")   Wt 58.7 kg (129 lb 6.4 oz)   SpO2 99%   BMI 20.90 kg/m²   Estimated body mass index is 20.9 kg/m² as calculated from the following:    Height as of this encounter: 167.6 cm (65.98\").    Weight as of this encounter: 58.7 kg (129 lb 6.4 oz).  Facility age limit for growth %tamara is 20 years.    BMI is within normal parameters. No other follow-up for BMI required.          Physical Exam  Constitutional:       Appearance: Normal appearance.   Cardiovascular:      Rate and Rhythm: Normal rate.   Pulmonary:      Effort: Pulmonary effort is normal.      Breath sounds: Normal breath sounds. No wheezing.   Abdominal:      Palpations: Abdomen is soft.      Tenderness: There is no abdominal tenderness.   Musculoskeletal:         General: No swelling.      Cervical back: Neck supple. No tenderness.   Neurological:      Mental Status: He is alert and oriented to person, place, and time.   Psychiatric:         Mood and Affect: Mood normal.        Result Review :  The following data was reviewed by: Mahrokh Nokhbehzaeim, MD on 08/01/2025:  CMP          6/26/2025    11:37   CMP   Glucose 86    BUN 8.0  " "  Creatinine 0.76    EGFR 115.2    Sodium 139    Potassium 3.8    Chloride 103    Calcium 9.3    Total Protein 7.2    Albumin 4.5    Globulin 2.7    Total Bilirubin 0.5    Alkaline Phosphatase 73    AST (SGOT) 14    ALT (SGPT) 9    Albumin/Globulin Ratio 1.7    BUN/Creatinine Ratio 10.5    Anion Gap 13.0      CMP          6/26/2025    11:37   CMP   Glucose 86    BUN 8.0    Creatinine 0.76    EGFR 115.2    Sodium 139    Potassium 3.8    Chloride 103    Calcium 9.3    Total Protein 7.2    Albumin 4.5    Globulin 2.7    Total Bilirubin 0.5    Alkaline Phosphatase 73    AST (SGOT) 14    ALT (SGPT) 9    Albumin/Globulin Ratio 1.7    BUN/Creatinine Ratio 10.5    Anion Gap 13.0       CBC          6/26/2025    11:37   CBC   WBC 4.57    RBC 4.62    Hemoglobin 10.8    Hematocrit 35.2    MCV 76.2    MCH 23.4    MCHC 30.7    RDW 15.0    Platelets 186       TSH          6/26/2025    11:37   TSH   TSH 2.240       Free T4   Date Value Ref Range Status   12/28/2021 0.96 0.70 - 1.70 ng/dL Final      No results found for: \"TESTOSTEROTT\", \"TESTOSTERONE\", \"TESTFRE\"   No results found for: \"ESTRADIOL\"                Assessment and Plan   Diagnoses and all orders for this visit:    1. Transgender male (Primary)  Assessment & Plan:  Check baseline labs today  Once cleared by psychiatry to start hormonal treatment, I will send a prescription for testosterone gel  Side effect of testosterone discussed in detail, including erythrocytosis, liver abnormalities, coronary artery disease, and hypertension      Orders:  -     Comprehensive Metabolic Panel  -     Testosterone  -     Estradiol  -     CBC (No Diff)             Follow Up   Return in about 3 months (around 11/1/2025).  Patient was given instructions and counseling regarding his condition or for health maintenance advice. Please see specific information pulled into the AVS if appropriate.       "

## 2025-08-04 ENCOUNTER — TELEPHONE (OUTPATIENT)
Dept: PSYCHIATRY | Facility: CLINIC | Age: 20
End: 2025-08-04
Payer: COMMERCIAL

## 2025-08-04 ENCOUNTER — PATIENT MESSAGE (OUTPATIENT)
Dept: ENDOCRINOLOGY | Age: 20
End: 2025-08-04
Payer: COMMERCIAL

## 2025-08-04 DIAGNOSIS — Z78.9 TRANSGENDER MALE: Primary | ICD-10-CM

## 2025-08-05 ENCOUNTER — CLINICAL SUPPORT (OUTPATIENT)
Dept: FAMILY MEDICINE CLINIC | Facility: CLINIC | Age: 20
End: 2025-08-05
Payer: COMMERCIAL

## 2025-08-05 DIAGNOSIS — D50.9 IRON DEFICIENCY ANEMIA, UNSPECIFIED IRON DEFICIENCY ANEMIA TYPE: Primary | ICD-10-CM

## 2025-08-05 LAB
FERRITIN SERPL-MCNC: 16.2 NG/ML (ref 13–150)
IRON 24H UR-MRATE: 36 MCG/DL (ref 37–145)
IRON SATN MFR SERPL: 6 % (ref 20–50)
TIBC SERPL-MCNC: 597 MCG/DL (ref 298–536)
TRANSFERRIN SERPL-MCNC: 401 MG/DL (ref 200–360)

## 2025-08-05 PROCEDURE — 36415 COLL VENOUS BLD VENIPUNCTURE: CPT | Performed by: NURSE PRACTITIONER

## 2025-08-05 PROCEDURE — 83540 ASSAY OF IRON: CPT | Performed by: NURSE PRACTITIONER

## 2025-08-05 PROCEDURE — 84466 ASSAY OF TRANSFERRIN: CPT | Performed by: NURSE PRACTITIONER

## 2025-08-05 PROCEDURE — 82607 VITAMIN B-12: CPT | Performed by: NURSE PRACTITIONER

## 2025-08-05 PROCEDURE — 82746 ASSAY OF FOLIC ACID SERUM: CPT | Performed by: NURSE PRACTITIONER

## 2025-08-05 PROCEDURE — 82728 ASSAY OF FERRITIN: CPT | Performed by: NURSE PRACTITIONER

## 2025-08-06 DIAGNOSIS — Z78.9 TRANSGENDER MALE: Primary | ICD-10-CM

## 2025-08-06 RX ORDER — TESTOSTERONE 20.25 MG/1.25G
GEL TOPICAL
Qty: 113 G | Refills: 0 | Status: SHIPPED | OUTPATIENT
Start: 2025-08-06 | End: 2025-08-08 | Stop reason: SDUPTHER

## 2025-08-08 ENCOUNTER — PRIOR AUTHORIZATION (OUTPATIENT)
Dept: ENDOCRINOLOGY | Age: 20
End: 2025-08-08
Payer: COMMERCIAL

## 2025-08-08 DIAGNOSIS — Z78.9 TRANSGENDER MALE: ICD-10-CM

## 2025-08-08 RX ORDER — TESTOSTERONE 20.25 MG/1.25G
GEL TOPICAL
Qty: 113 G | Refills: 0 | Status: CANCELLED | OUTPATIENT
Start: 2025-08-08

## 2025-08-08 RX ORDER — TESTOSTERONE 20.25 MG/1.25G
GEL TOPICAL
Qty: 113 G | Refills: 0 | Status: SHIPPED | OUTPATIENT
Start: 2025-08-08

## 2025-08-12 ENCOUNTER — OFFICE VISIT (OUTPATIENT)
Dept: FAMILY MEDICINE CLINIC | Facility: CLINIC | Age: 20
End: 2025-08-12
Payer: COMMERCIAL

## 2025-08-12 VITALS
WEIGHT: 127.3 LBS | HEIGHT: 66 IN | HEART RATE: 110 BPM | BODY MASS INDEX: 20.46 KG/M2 | DIASTOLIC BLOOD PRESSURE: 60 MMHG | OXYGEN SATURATION: 100 % | SYSTOLIC BLOOD PRESSURE: 112 MMHG | TEMPERATURE: 97.3 F

## 2025-08-12 DIAGNOSIS — M25.50 POLYARTHRALGIA: Primary | ICD-10-CM

## 2025-08-12 DIAGNOSIS — M25.551 RIGHT HIP PAIN: ICD-10-CM

## 2025-08-12 LAB
25(OH)D3 SERPL-MCNC: 21.7 NG/ML (ref 30–100)
BASOPHILS # BLD AUTO: 0.02 10*3/MM3 (ref 0–0.2)
BASOPHILS NFR BLD AUTO: 0.8 % (ref 0–1.5)
CHROMATIN AB SERPL-ACNC: 10.2 IU/ML (ref 0–14)
CRP SERPL-MCNC: <0.3 MG/DL (ref 0–0.5)
DEPRECATED RDW RBC AUTO: 41.9 FL (ref 37–54)
EOSINOPHIL # BLD AUTO: 0 10*3/MM3 (ref 0–0.4)
EOSINOPHIL NFR BLD AUTO: 0 % (ref 0.3–6.2)
ERYTHROCYTE [DISTWIDTH] IN BLOOD BY AUTOMATED COUNT: 15.5 % (ref 12.3–15.4)
HCT VFR BLD AUTO: 35.7 % (ref 34–46.6)
HGB BLD-MCNC: 11.4 G/DL (ref 12–15.9)
IMM GRANULOCYTES # BLD AUTO: 0 10*3/MM3 (ref 0–0.05)
IMM GRANULOCYTES NFR BLD AUTO: 0 % (ref 0–0.5)
LYMPHOCYTES # BLD AUTO: 1.3 10*3/MM3 (ref 0.7–3.1)
LYMPHOCYTES NFR BLD AUTO: 50.4 % (ref 19.6–45.3)
MCH RBC QN AUTO: 24.6 PG (ref 26.6–33)
MCHC RBC AUTO-ENTMCNC: 31.9 G/DL (ref 31.5–35.7)
MCV RBC AUTO: 76.9 FL (ref 79–97)
MONOCYTES # BLD AUTO: 0.19 10*3/MM3 (ref 0.1–0.9)
MONOCYTES NFR BLD AUTO: 7.4 % (ref 5–12)
NEUTROPHILS NFR BLD AUTO: 1.07 10*3/MM3 (ref 1.7–7)
NEUTROPHILS NFR BLD AUTO: 41.4 % (ref 42.7–76)
NRBC BLD AUTO-RTO: 0 /100 WBC (ref 0–0.2)
PLATELET # BLD AUTO: 190 10*3/MM3 (ref 140–450)
PMV BLD AUTO: 12.1 FL (ref 6–12)
RBC # BLD AUTO: 4.64 10*6/MM3 (ref 3.77–5.28)
WBC NRBC COR # BLD AUTO: 2.58 10*3/MM3 (ref 3.4–10.8)

## 2025-08-12 PROCEDURE — 82306 VITAMIN D 25 HYDROXY: CPT | Performed by: NURSE PRACTITIONER

## 2025-08-12 PROCEDURE — 86140 C-REACTIVE PROTEIN: CPT | Performed by: NURSE PRACTITIONER

## 2025-08-12 PROCEDURE — 86038 ANTINUCLEAR ANTIBODIES: CPT | Performed by: NURSE PRACTITIONER

## 2025-08-12 PROCEDURE — 85025 COMPLETE CBC W/AUTO DIFF WBC: CPT | Performed by: NURSE PRACTITIONER

## 2025-08-12 PROCEDURE — 86431 RHEUMATOID FACTOR QUANT: CPT | Performed by: NURSE PRACTITIONER

## 2025-08-12 RX ORDER — TESTOSTERONE 20.25 MG/1.25G
GEL TOPICAL
Qty: 113 G | Refills: 0 | Status: SHIPPED | OUTPATIENT
Start: 2025-08-12

## 2025-08-13 LAB — ANA SER QL: NEGATIVE

## 2025-08-18 ENCOUNTER — PRIOR AUTHORIZATION (OUTPATIENT)
Dept: ENDOCRINOLOGY | Age: 20
End: 2025-08-18
Payer: COMMERCIAL

## 2025-08-20 ENCOUNTER — TELEPHONE (OUTPATIENT)
Dept: ENDOCRINOLOGY | Age: 20
End: 2025-08-20
Payer: COMMERCIAL

## 2025-08-20 ENCOUNTER — SPECIALTY PHARMACY (OUTPATIENT)
Dept: ENDOCRINOLOGY | Age: 20
End: 2025-08-20
Payer: COMMERCIAL

## 2025-08-27 ENCOUNTER — SPECIALTY PHARMACY (OUTPATIENT)
Dept: ENDOCRINOLOGY | Age: 20
End: 2025-08-27
Payer: COMMERCIAL

## 2025-08-27 DIAGNOSIS — Z78.9 TRANSGENDER MALE: ICD-10-CM

## 2025-08-27 RX ORDER — TESTOSTERONE 1.62 MG/G
GEL TRANSDERMAL
Qty: 150 G | Refills: 0 | Status: SHIPPED | OUTPATIENT
Start: 2025-08-27